# Patient Record
Sex: MALE | Race: WHITE | HISPANIC OR LATINO | URBAN - METROPOLITAN AREA
[De-identification: names, ages, dates, MRNs, and addresses within clinical notes are randomized per-mention and may not be internally consistent; named-entity substitution may affect disease eponyms.]

---

## 2018-09-15 ENCOUNTER — EMERGENCY (EMERGENCY)
Facility: HOSPITAL | Age: 38
LOS: 1 days | Discharge: HOME | End: 2018-09-15
Attending: EMERGENCY MEDICINE
Payer: COMMERCIAL

## 2018-09-15 VITALS
HEART RATE: 75 BPM | WEIGHT: 231.93 LBS | RESPIRATION RATE: 18 BRPM | DIASTOLIC BLOOD PRESSURE: 89 MMHG | TEMPERATURE: 98 F | SYSTOLIC BLOOD PRESSURE: 145 MMHG | OXYGEN SATURATION: 97 % | HEIGHT: 70 IN

## 2018-09-15 PROCEDURE — 99284 EMERGENCY DEPT VISIT MOD MDM: CPT | Mod: 25

## 2018-09-16 VITALS
DIASTOLIC BLOOD PRESSURE: 86 MMHG | OXYGEN SATURATION: 98 % | HEART RATE: 87 BPM | SYSTOLIC BLOOD PRESSURE: 132 MMHG | TEMPERATURE: 99 F | RESPIRATION RATE: 19 BRPM

## 2018-09-16 DIAGNOSIS — Z90.49 ACQUIRED ABSENCE OF OTHER SPECIFIED PARTS OF DIGESTIVE TRACT: Chronic | ICD-10-CM

## 2018-09-16 LAB
ANION GAP SERPL CALC-SCNC: 3 MMOL/L — LOW (ref 5–17)
BASOPHILS # BLD AUTO: 0.1 K/UL — SIGNIFICANT CHANGE UP (ref 0–0.2)
BASOPHILS NFR BLD AUTO: 0.9 % — SIGNIFICANT CHANGE UP (ref 0–2)
BUN SERPL-MCNC: 16 MG/DL — SIGNIFICANT CHANGE UP (ref 7–18)
CALCIUM SERPL-MCNC: 9.3 MG/DL — SIGNIFICANT CHANGE UP (ref 8.4–10.5)
CHLORIDE SERPL-SCNC: 109 MMOL/L — HIGH (ref 96–108)
CO2 SERPL-SCNC: 28 MMOL/L — SIGNIFICANT CHANGE UP (ref 22–31)
CREAT SERPL-MCNC: 0.9 MG/DL — SIGNIFICANT CHANGE UP (ref 0.5–1.3)
EOSINOPHIL # BLD AUTO: 0.2 K/UL — SIGNIFICANT CHANGE UP (ref 0–0.5)
EOSINOPHIL NFR BLD AUTO: 1.3 % — SIGNIFICANT CHANGE UP (ref 0–6)
GLUCOSE SERPL-MCNC: 115 MG/DL — HIGH (ref 70–99)
HCT VFR BLD CALC: 44.5 % — SIGNIFICANT CHANGE UP (ref 39–50)
HGB BLD-MCNC: 14.9 G/DL — SIGNIFICANT CHANGE UP (ref 13–17)
LYMPHOCYTES # BLD AUTO: 23.1 % — SIGNIFICANT CHANGE UP (ref 13–44)
LYMPHOCYTES # BLD AUTO: 3.2 K/UL — SIGNIFICANT CHANGE UP (ref 1–3.3)
MCHC RBC-ENTMCNC: 29.4 PG — SIGNIFICANT CHANGE UP (ref 27–34)
MCHC RBC-ENTMCNC: 33.5 GM/DL — SIGNIFICANT CHANGE UP (ref 32–36)
MCV RBC AUTO: 87.7 FL — SIGNIFICANT CHANGE UP (ref 80–100)
MONOCYTES # BLD AUTO: 0.9 K/UL — SIGNIFICANT CHANGE UP (ref 0–0.9)
MONOCYTES NFR BLD AUTO: 6.1 % — SIGNIFICANT CHANGE UP (ref 2–14)
NEUTROPHILS # BLD AUTO: 9.6 K/UL — HIGH (ref 1.8–7.4)
NEUTROPHILS NFR BLD AUTO: 68.6 % — SIGNIFICANT CHANGE UP (ref 43–77)
PLATELET # BLD AUTO: 255 K/UL — SIGNIFICANT CHANGE UP (ref 150–400)
POTASSIUM SERPL-MCNC: 5.3 MMOL/L — SIGNIFICANT CHANGE UP (ref 3.5–5.3)
POTASSIUM SERPL-SCNC: 5.3 MMOL/L — SIGNIFICANT CHANGE UP (ref 3.5–5.3)
RBC # BLD: 5.08 M/UL — SIGNIFICANT CHANGE UP (ref 4.2–5.8)
RBC # FLD: 12 % — SIGNIFICANT CHANGE UP (ref 10.3–14.5)
SODIUM SERPL-SCNC: 140 MMOL/L — SIGNIFICANT CHANGE UP (ref 135–145)
WBC # BLD: 14 K/UL — HIGH (ref 3.8–10.5)
WBC # FLD AUTO: 14 K/UL — HIGH (ref 3.8–10.5)

## 2018-09-16 PROCEDURE — 51702 INSERT TEMP BLADDER CATH: CPT

## 2018-09-16 PROCEDURE — 99284 EMERGENCY DEPT VISIT MOD MDM: CPT | Mod: 25

## 2018-09-16 PROCEDURE — 85027 COMPLETE CBC AUTOMATED: CPT

## 2018-09-16 PROCEDURE — 96374 THER/PROPH/DIAG INJ IV PUSH: CPT | Mod: XU

## 2018-09-16 PROCEDURE — 81001 URINALYSIS AUTO W/SCOPE: CPT

## 2018-09-16 PROCEDURE — 80048 BASIC METABOLIC PNL TOTAL CA: CPT

## 2018-09-16 PROCEDURE — 87086 URINE CULTURE/COLONY COUNT: CPT

## 2018-09-16 RX ORDER — CEFTRIAXONE 500 MG/1
1 INJECTION, POWDER, FOR SOLUTION INTRAMUSCULAR; INTRAVENOUS ONCE
Qty: 0 | Refills: 0 | Status: COMPLETED | OUTPATIENT
Start: 2018-09-16 | End: 2018-09-16

## 2018-09-16 RX ORDER — AZTREONAM 2 G
1 VIAL (EA) INJECTION
Qty: 14 | Refills: 0 | OUTPATIENT
Start: 2018-09-16 | End: 2018-09-22

## 2018-09-16 RX ADMIN — CEFTRIAXONE 100 GRAM(S): 500 INJECTION, POWDER, FOR SOLUTION INTRAMUSCULAR; INTRAVENOUS at 05:49

## 2018-09-16 NOTE — ED ADULT NURSE NOTE - NSIMPLEMENTINTERV_GEN_ALL_ED
Implemented All Universal Safety Interventions:  [place] to call system. Call bell, personal items and telephone within reach. Instruct patient to call for assistance. Room bathroom lighting operational. Non-slip footwear when patient is off stretcher. Physically safe environment: no spills, clutter or unnecessary equipment. Stretcher in lowest position, wheels locked, appropriate side rails in place.

## 2018-09-16 NOTE — ED PROVIDER NOTE - NS ED ROS FT
Constitutional: - Fever, - Chills, - unexplained weight loss  Eyes: - Discharge, - Irritation, - Redness, - Visual changes, - Light sensitivity  EARS: - Ear Pain, - hearing loss  NOSE: - Congestion, - epistaxis  MOUTH/THROAT: - Vocal Changes, - Drooling, - Sore throat  NECK: - Lumps, - Stiffness, - Pain  CV: - Palpitations, - Chest Pain, - Edema   RESP:  - Cough, - Shortness of Breath, - Dyspnea on Exertion, - Wheezing  GI: - Diarrhea, - Constipation, - Bloody stools, - Nausea, - Vomiting, - Abdominal Pain  : - Dysuria, -Frequency, - Hematuria  MSK: - Myalgias, - focal weakness, - Injuries  SKIN: - Color change, - Rash  NEURO: - Change in behavior, - Dec. Alertness, - Headache, - Change in speech, - Seizure-like activity

## 2018-09-16 NOTE — ED PROVIDER NOTE - MEDICAL DECISION MAKING DETAILS
no risk factors for anticholinergic toxicity, no prostate hx, acute urinary retention with leukocytosis, given leukocytosis with normal prostate exam likely 2/2 UTI, pain relieved by landaverde placement, no BIJAL, will dc with abx for UTI urology and pcp f/u and landaverde to leg bag. pt and wife given extensive return precautions which they demonstrated understanding of. given info for urology f/u.

## 2018-09-16 NOTE — ED PROVIDER NOTE - PHYSICAL EXAMINATION
PE:   GEN: Awake, alert, oriented, interactive, NAD, well appearing.   HEAD: Atraumatic, normocephalic  EYES: Sclera white, conjunctiva pink, PERRLA  CARDIAC: Reg rate and rhythm, S1,S2, no murmur/rub/gallop. Strong central and peripheral pulses, Brisk cap refill, no evident pedal edema.   RESP: Normal respiratory rate and effort, no resp distress, lungs cta b/l without accessory muscle use, wheeze, rhonchi, or rales.   ABD: soft, suprapubic tenderness and distention prior to landaverde, nontender nondistended after landaverde placement  : normal external genitalia, prostate wnl nontender nonboggy  NEURO: AOx3, CN II-XII grossly intact without focal deficits   MSK: Moving all extremities spontaneously, no apparent deformities  SKIN: warm, dry

## 2018-09-16 NOTE — ED PROVIDER NOTE - OBJECTIVE STATEMENT
38M no sig pmh p/w 1d urinary retention. Unable to urinate more than few drops since waking up 9/15. Pt sexually monogamous with wife. Pt denies any prior episodes, dysuria, hematuria, freq, urgency, flank pain, prostate hx, fever, unexplained weight loss, trauma, FB, renal dysfxn, n/v/d/c/melena/brbpr, meds/drugs

## 2018-09-17 LAB
CULTURE RESULTS: NO GROWTH — SIGNIFICANT CHANGE UP
SPECIMEN SOURCE: SIGNIFICANT CHANGE UP

## 2022-11-24 ENCOUNTER — EMERGENCY (EMERGENCY)
Facility: HOSPITAL | Age: 42
LOS: 1 days | Discharge: HOME | End: 2022-11-24
Attending: EMERGENCY MEDICINE
Payer: COMMERCIAL

## 2022-11-24 VITALS
DIASTOLIC BLOOD PRESSURE: 81 MMHG | RESPIRATION RATE: 18 BRPM | SYSTOLIC BLOOD PRESSURE: 130 MMHG | HEART RATE: 85 BPM | OXYGEN SATURATION: 96 % | WEIGHT: 235.01 LBS | HEIGHT: 70 IN | TEMPERATURE: 98 F

## 2022-11-24 DIAGNOSIS — Z90.49 ACQUIRED ABSENCE OF OTHER SPECIFIED PARTS OF DIGESTIVE TRACT: Chronic | ICD-10-CM

## 2022-11-24 LAB
APPEARANCE UR: CLEAR — SIGNIFICANT CHANGE UP
BILIRUB UR-MCNC: NEGATIVE — SIGNIFICANT CHANGE UP
COLOR SPEC: YELLOW — SIGNIFICANT CHANGE UP
DIFF PNL FLD: ABNORMAL
GLUCOSE UR QL: NEGATIVE — SIGNIFICANT CHANGE UP
KETONES UR-MCNC: NEGATIVE — SIGNIFICANT CHANGE UP
LEUKOCYTE ESTERASE UR-ACNC: ABNORMAL
NITRITE UR-MCNC: NEGATIVE — SIGNIFICANT CHANGE UP
PH UR: 5 — SIGNIFICANT CHANGE UP (ref 5–8)
PROT UR-MCNC: 100
SP GR SPEC: 1.02 — SIGNIFICANT CHANGE UP (ref 1.01–1.02)
UROBILINOGEN FLD QL: NEGATIVE — SIGNIFICANT CHANGE UP

## 2022-11-24 PROCEDURE — 74019 RADEX ABDOMEN 2 VIEWS: CPT | Mod: 26

## 2022-11-24 PROCEDURE — 99283 EMERGENCY DEPT VISIT LOW MDM: CPT | Mod: 25

## 2022-11-24 PROCEDURE — 99284 EMERGENCY DEPT VISIT MOD MDM: CPT

## 2022-11-24 PROCEDURE — 87086 URINE CULTURE/COLONY COUNT: CPT

## 2022-11-24 PROCEDURE — 81001 URINALYSIS AUTO W/SCOPE: CPT

## 2022-11-24 PROCEDURE — 74019 RADEX ABDOMEN 2 VIEWS: CPT

## 2022-11-24 NOTE — ED PROVIDER NOTE - OBJECTIVE STATEMENT
42 year old female denies PMH coming in for lower abd pain and blood in his urine. pt states that the pain has completely resolved spontaneously. states he also felt like he was constipated and last Bm was yesterday. denies all other complaints.

## 2022-11-24 NOTE — ED PROVIDER NOTE - PATIENT PORTAL LINK FT
You can access the FollowMyHealth Patient Portal offered by [facility] by registering at the following website: [email or web address]. By joining [employer]’s FollowMyHealth portal, you will also be able to view your health information using other applications (apps) compatible with our system.

## 2022-11-24 NOTE — ED PROVIDER NOTE - CLINICAL SUMMARY MEDICAL DECISION MAKING FREE TEXT BOX
Received request via: Pharmacy    Was the patient seen in the last year in this department? Yes    Does the patient have an active prescription (recently filled or refills available) for medication(s) requested? No    
42 year old male with hematuria/difficulty urinating, constipation. PE as above.  UA, bladder scan, xray abdomen, reassess

## 2022-11-24 NOTE — ED PROVIDER NOTE - NSFOLLOWUPINSTRUCTIONS_ED_ALL_ED_FT
[facility]                                                                                                                    Kidney Stones       Kidney stones are solid, rock-like deposits that form inside of the kidneys. The kidneys are a pair of organs that make urine. A kidney stone may form in a kidney and move into other parts of the urinary tract, including the tubes that connect the kidneys to the bladder (ureters), the bladder, and the tube that carries urine out of the body (urethra). As the stone moves through these areas, it can cause intense pain and block the flow of urine.    Kidney stones are created when high levels of certain minerals are found in the urine. The stones are usually passed out of the body through urination, but in some cases, medical treatment may be needed to remove them.      What are the causes?    Kidney stones may be caused by:  •A condition in which certain glands produce too much parathyroid hormone (primary hyperparathyroidism), which causes too much calcium buildup in the blood.      •A buildup of uric acid crystals in the bladder (hyperuricosuria). Uric acid is a chemical that the body produces when you eat certain foods. It usually exits the body in the urine.      •Narrowing (stricture) of one or both of the ureters.      •A kidney blockage that is present at birth (congenital obstruction).      •Past surgery on the kidney or the ureters, such as gastric bypass surgery.        What increases the risk?    The following factors may make you more likely to develop this condition:  •Having had a kidney stone in the past.      •Having a family history of kidney stones.      •Not drinking enough water.      •Eating a diet that is high in protein, salt (sodium), or sugar.      •Being overweight or obese.        What are the signs or symptoms?    Symptoms of a kidney stone may include:  •Pain in the side of the abdomen, right below the ribs (flank pain). Pain usually spreads (radiates) to the groin.      •Needing to urinate frequently or urgently.      •Painful urination.      •Blood in the urine (hematuria).      •Nausea.      •Vomiting.      •Fever and chills.        How is this diagnosed?    This condition may be diagnosed based on:  •Your symptoms and medical history.      •A physical exam.      •Blood tests.      •Urine tests. These may be done before and after the stone passes out of your body through urination.      •Imaging tests, such as a CT scan, abdominal X-ray, or ultrasound.      •A procedure to examine the inside of the bladder (cystoscopy).        How is this treated?    Treatment for kidney stones depends on the size, location, and makeup of the stones. Kidney stones will often pass out of the body through urination. You may need to:  •Increase your fluid intake to help pass the stone. In some cases, you may be given fluids through an IV and may need to be monitored at the hospital.      •Take medicine for pain.      •Make changes in your diet to help prevent kidney stones from coming back.      Sometimes, medical procedures are needed to remove a kidney stone. This may involve:•A procedure to break up kidney stones using:  •A focused beam of light (laser therapy).      •Shock waves (extracorporeal shock wave lithotripsy).        •Surgery to remove kidney stones. This may be needed if you have severe pain or have stones that block your urinary tract.        Follow these instructions at home:    Medicines     •Take over-the-counter and prescription medicines only as told by your health care provider.      •Ask your health care provider if the medicine prescribed to you requires you to avoid driving or using heavy machinery.      Eating and drinking     •Drink enough fluid to keep your urine pale yellow. You may be instructed to drink at least 8–10 glasses of water each day. This will help you pass the kidney stone.    •If directed, change your diet. This may include:  •Limiting how much sodium you eat.      •Eating more fruits and vegetables.      •Limiting how much animal protein—such as red meat, poultry, fish, and eggs—you eat.        •Follow instructions from your health care provider about eating or drinking restrictions.      General instructions   •Collect urine samples as told by your health care provider. You may need to collect a urine sample:  •24 hours after you pass the stone.      •8–12 weeks after passing the kidney stone, and every 6–12 months after that.        •Strain your urine every time you urinate, for as long as directed. Use the strainer that your health care provider recommends.      • Do not throw out the kidney stone after passing it. Keep the stone so it can be tested by your health care provider. Testing the makeup of your kidney stone may help prevent you from getting kidney stones in the future.      •Keep all follow-up visits as told by your health care provider. This is important. You may need follow-up X-rays or ultrasounds to make sure that your stone has passed.        How is this prevented?     To prevent another kidney stone:  •Drink enough fluid to keep your urine pale yellow. This is the best way to prevent kidney stones.      •Eat a healthy diet and follow recommendations from your health care provider about foods to avoid. You may be instructed to eat a low-protein diet. Recommendations vary depending on the type of kidney stone that you have.      •Maintain a healthy weight.        Where to find more information    •National Kidney Foundation (NKF): www.kidney.org      •[employer] (UCF): www.urologyhealth.org        Contact a health care provider if:    •You have pain that gets worse or does not get better with medicine.        Get help right away if:    •You have a fever or chills.      •You develop severe pain.      •You develop new abdominal pain.      •You faint.      •You are unable to urinate.        Summary    •Kidney stones are solid, rock-like deposits that form inside of the kidneys.      •Kidney stones can cause nausea, vomiting, blood in the urine, abdominal pain, and the urge to urinate frequently.      •Treatment for kidney stones depends on the size, location, and makeup of the stones. Kidney stones will often pass out of the body through urination.      •Kidney stones can be prevented by drinking enough fluids, eating a healthy diet, and maintaining a healthy weight.      This information is not intended to replace advice given to you by your health care provider. Make sure you discuss any questions you have with your health care provider.      Document Revised: 05/01/2020 Document Reviewed: 05/05/2020    [employer] Patient Education © 2022 [employer]

## 2022-11-25 LAB
CULTURE RESULTS: SIGNIFICANT CHANGE UP
SPECIMEN SOURCE: SIGNIFICANT CHANGE UP

## 2024-02-26 NOTE — ED ADULT NURSE NOTE - PAIN: PRESENCE, MLM
Dr. Paez's office will contact patient for a 2 week follow up appointment.    complains of pain/discomfort

## 2024-10-10 NOTE — ED ADULT TRIAGE NOTE - HISTORY OF COVID-19 VACCINATION
I agree with the Care Coordinator's Plan of Care   I attempted to call patient but no answer - wanted to discuss if need antibiotic at this time.  Consider Augmentin 875mg BID x 7 days.   Yes

## 2024-11-08 NOTE — ED PROVIDER NOTE - PROGRESS NOTE DETAILS
No
UA with+blood and calcium oxalate crystals. no uti. xray abdomen unremarkable. ?passed renal stone given calcium oxalate crystals. will dc. f/u with urology. return precautions discussed.

## 2024-11-20 VITALS
DIASTOLIC BLOOD PRESSURE: 91 MMHG | TEMPERATURE: 97 F | HEART RATE: 64 BPM | WEIGHT: 254.63 LBS | HEIGHT: 70 IN | RESPIRATION RATE: 16 BRPM | SYSTOLIC BLOOD PRESSURE: 142 MMHG | OXYGEN SATURATION: 96 %

## 2024-11-20 NOTE — PATIENT PROFILE ADULT - FALL HARM RISK - HARM RISK INTERVENTIONS

## 2024-11-21 ENCOUNTER — OUTPATIENT (OUTPATIENT)
Dept: INPATIENT UNIT | Facility: HOSPITAL | Age: 44
LOS: 1 days | Discharge: HOME | End: 2024-11-21
Payer: COMMERCIAL

## 2024-11-21 DIAGNOSIS — Z98.890 OTHER SPECIFIED POSTPROCEDURAL STATES: Chronic | ICD-10-CM

## 2024-11-21 DIAGNOSIS — Z90.49 ACQUIRED ABSENCE OF OTHER SPECIFIED PARTS OF DIGESTIVE TRACT: Chronic | ICD-10-CM

## 2024-11-21 PROCEDURE — 88305 TISSUE EXAM BY PATHOLOGIST: CPT | Mod: 26

## 2024-11-21 DEVICE — MESH HERNIA INGUINAL PARIETEX PROGRIP RECTANGLE 15 X 15CM: Type: IMPLANTABLE DEVICE | Site: ABDOMEN | Status: FUNCTIONAL

## 2024-11-21 RX ORDER — INFLUENZA VIRUS VACCINE 15; 15; 15; 15 UG/.5ML; UG/.5ML; UG/.5ML; UG/.5ML
0.5 SUSPENSION INTRAMUSCULAR ONCE
Refills: 0 | Status: ACTIVE | OUTPATIENT
Start: 2024-11-21 | End: 2025-10-20

## 2024-11-21 RX ORDER — FAMOTIDINE 20 MG/1
20 TABLET, FILM COATED ORAL ONCE
Refills: 0 | Status: COMPLETED | OUTPATIENT
Start: 2024-11-21 | End: 2024-11-21

## 2024-11-21 RX ORDER — SITAGLIPTIN 50 MG/1
1 TABLET ORAL
Refills: 0 | DISCHARGE

## 2024-11-21 RX ORDER — DIPHENHYDRAMINE HCL 25 MG
25 CAPSULE ORAL ONCE
Refills: 0 | Status: DISCONTINUED | OUTPATIENT
Start: 2024-11-21 | End: 2024-11-21

## 2024-11-21 RX ORDER — OXYCODONE HYDROCHLORIDE 30 MG/1
1 TABLET ORAL
Qty: 10 | Refills: 0
Start: 2024-11-21

## 2024-11-21 RX ORDER — DIPHENHYDRAMINE HCL 25 MG
25 CAPSULE ORAL ONCE
Refills: 0 | Status: COMPLETED | OUTPATIENT
Start: 2024-11-21 | End: 2024-11-21

## 2024-11-21 RX ORDER — ONDANSETRON HYDROCHLORIDE 4 MG/1
4 TABLET, FILM COATED ORAL EVERY 6 HOURS
Refills: 0 | Status: ACTIVE | OUTPATIENT
Start: 2024-11-21 | End: 2025-10-20

## 2024-11-21 RX ORDER — DEXAMETHASONE 1.5 MG/1
4 TABLET ORAL ONCE
Refills: 0 | Status: COMPLETED | OUTPATIENT
Start: 2024-11-21 | End: 2024-11-21

## 2024-11-21 RX ORDER — DOCUSATE SODIUM 100 MG
1 CAPSULE ORAL
Qty: 10 | Refills: 0
Start: 2024-11-21 | End: 2024-11-25

## 2024-11-21 RX ORDER — OXYCODONE HYDROCHLORIDE 30 MG/1
2.5 TABLET ORAL EVERY 6 HOURS
Refills: 0 | Status: DISCONTINUED | OUTPATIENT
Start: 2024-11-21 | End: 2024-11-21

## 2024-11-21 RX ORDER — 0.9 % SODIUM CHLORIDE 0.9 %
1000 INTRAVENOUS SOLUTION INTRAVENOUS
Refills: 0 | Status: DISCONTINUED | OUTPATIENT
Start: 2024-11-21 | End: 2024-11-22

## 2024-11-21 RX ORDER — 0.9 % SODIUM CHLORIDE 0.9 %
1000 INTRAVENOUS SOLUTION INTRAVENOUS
Refills: 0 | Status: ACTIVE | OUTPATIENT
Start: 2024-11-21 | End: 2025-10-20

## 2024-11-21 RX ORDER — HEPARIN SODIUM,PORCINE 1000/ML
7500 VIAL (ML) INJECTION EVERY 8 HOURS
Refills: 0 | Status: ACTIVE | OUTPATIENT
Start: 2024-11-21 | End: 2025-10-20

## 2024-11-21 RX ORDER — GLUCAGON INJECTION, SOLUTION 0.5 MG/.1ML
1 INJECTION, SOLUTION SUBCUTANEOUS ONCE
Refills: 0 | Status: ACTIVE | OUTPATIENT
Start: 2024-11-21 | End: 2025-10-20

## 2024-11-21 RX ORDER — HYDROMORPHONE HYDROCHLORIDE 2 MG/1
0.5 TABLET ORAL
Refills: 0 | Status: DISCONTINUED | OUTPATIENT
Start: 2024-11-21 | End: 2024-11-21

## 2024-11-21 RX ORDER — ACETAMINOPHEN 500MG 500 MG/1
650 TABLET, COATED ORAL EVERY 6 HOURS
Refills: 0 | Status: ACTIVE | OUTPATIENT
Start: 2024-11-21 | End: 2025-10-20

## 2024-11-21 RX ORDER — OXYCODONE HYDROCHLORIDE 30 MG/1
5 TABLET ORAL EVERY 6 HOURS
Refills: 0 | Status: DISCONTINUED | OUTPATIENT
Start: 2024-11-21 | End: 2024-11-22

## 2024-11-21 RX ADMIN — DEXAMETHASONE 4 MILLIGRAM(S): 1.5 TABLET ORAL at 13:08

## 2024-11-21 RX ADMIN — Medication 75 MILLILITER(S): at 17:16

## 2024-11-21 RX ADMIN — Medication 25 MILLIGRAM(S): at 12:55

## 2024-11-21 RX ADMIN — Medication 4: at 22:55

## 2024-11-21 RX ADMIN — Medication 7500 UNIT(S): at 19:08

## 2024-11-21 RX ADMIN — FAMOTIDINE 20 MILLIGRAM(S): 20 TABLET, FILM COATED ORAL at 13:04

## 2024-11-21 RX ADMIN — Medication 4: at 17:30

## 2024-11-21 NOTE — BRIEF OPERATIVE NOTE - OPERATION/FINDINGS
Lower midline incision through previous scar. Subcutaneous tissue dissected down to fascia and lipocutaneous flaps raised circumferentially around ventral hernia. Adhesions dissected and sac ligated with 2.0 vicryl. Omental contents reduced back into abdomen. Posterior component separation completed and posterior rectus sheath closed with running 2.0 pds. Retrorectus space measured [id number]. Progrip mesh cut to size and placed into retrorectus space. Wound copiously irrigated with irricept. Anterior fascia closed with running 0 PDS suture. 19Fr kadi drain placed in subcutaneous space. Tissue closed in layers.

## 2024-11-21 NOTE — BRIEF OPERATIVE NOTE - NSICDXBRIEFPROCEDURE_GEN_ALL_CORE_FT
PROCEDURES:  Open repair of ventral hernia using component separation technique 21-Nov-2024 11:09:35 with retrorectus mesh Carlos Means

## 2024-11-21 NOTE — PRE-ANESTHESIA EVALUATION ADULT - NSDENTALSD_ENT_ALL_CORE
Missing rear molars, intermittent premolars. Poor dentition with heavily chipped teeth, ground/worn teeth, cavities./missing teeth

## 2024-11-21 NOTE — PRE-ANESTHESIA EVALUATION ADULT - NSANTHPMHFT_GEN_ALL_CORE
Cardiac: Positive for HLD. Denies HTN, MI/Angina/Heart Failure, Arrhythmia, Murmur/Valvular Disorder. >4 METS  Pulmonary: Denies Asthma, COPD, NOAH  Renal: Denies kidney dysfunction  Hepatic: Denies liver dysfunction, mild ALT elevation 47 noted on preoperative laboratories.   Gastrointestinal: Denies GERD/IBS  Endocrine: Positive for DM type II. Denies thyroid dysfunction.  Neurologic: Positive for left sided hearing loss. Denies stroke/seizure disorder  Hematologic: Denies anemia, blood clotting disorder, blood thinning medication.     PSH: Right arm surgery, intestinal surgery, cholecystectomy. Cardiac: Positive for HLD. Denies HTN, MI/Angina/Heart Failure, Arrhythmia, Murmur/Valvular Disorder. >4 METS  Pulmonary: Positive for NOAH non-compliant with CPAP. Denies Asthma, COPD.  Renal: Denies kidney dysfunction  Hepatic: Denies liver dysfunction, mild ALT elevation 47 noted on preoperative laboratories.   Gastrointestinal: Denies GERD/IBS  Endocrine: Positive for DM type II. Denies thyroid dysfunction.  Neurologic: Positive for left sided hearing loss. Denies stroke/seizure disorder  Hematologic: Denies anemia, blood clotting disorder, blood thinning medication.     PSH: Right arm surgery, intestinal surgery, cholecystectomy.

## 2024-11-21 NOTE — PRE-ANESTHESIA EVALUATION ADULT - NSANTHOSAYNRD_GEN_A_CORE
No. NOAH screening performed.  STOP BANG Legend: 0-2 = LOW Risk; 3-4 = INTERMEDIATE Risk; 5-8 = HIGH Risk Yes

## 2024-11-21 NOTE — ASU DISCHARGE PLAN (ADULT/PEDIATRIC) - FINANCIAL ASSISTANCE
[facility] provides services at a reduced cost to those who are determined to be eligible through [facility]’s financial assistance program. Information regarding [facility]’s financial assistance program can be found by going to [email or web address] or by calling [phone].
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
## 2024-11-21 NOTE — ASU DISCHARGE PLAN (ADULT/PEDIATRIC) - ASU DC SPECIAL INSTRUCTIONSFT
Follow up with Dr. Perla in 1-2 weeks. Call the office at the number below to schedule your appointment. You may shower; soap and water over incision sites. Do not scrub. Pat dry when done. No tub bathing or swimming until cleared. Keep incision sites out of the sun as scars will darken. Ambulate as tolerated, but no heavy lifting (>10lbs) or strenuous exercise. You may resume regular diet. You should be urinating at least 3-4x per day. Call the office if you experience increasing abdominal pain, nausea, vomiting, or temperature >101 F.    Please take Tylenol 650mg every six hours as needed for pain. For pain not controlled with Tylenol, please take Oxycodone 5mg every six hours, as needed. On days which you take Oxycodone, please also take Colace 100mg capsule twice daily to prevent constipation.

## 2024-11-21 NOTE — PROGRESS NOTE ADULT - SUBJECTIVE AND OBJECTIVE BOX
POST-OPERATIVE NOTE    Procedure: ventral hernia repair with posterior component separation and retrorectus mesh     Diagnosis/Indication: incisional hernia     Surgeon: Dr. Perla     S: Patient was seen and examined postoperatively. Patient has no complaints. Tolerating regular diet. Denies CP, SOB, N/V. Pain controlled with medication.    O:  T(C): 36.6 (11-21-24 @ 10:45), Max: 36.6 (11-21-24 @ 10:45)  T(F): 97.8 (11-21-24 @ 10:45), Max: 97.8 (11-21-24 @ 10:45)  HR: 80 (11-21-24 @ 13:00) (72 - 90)  BP: 111/65 (11-21-24 @ 13:00) (111/65 - 142/77)  RR: 16 (11-21-24 @ 13:00) (16 - 16)  SpO2: 92% (11-21-24 @ 13:00) (91% - 97%)  Wt(kg): --            Gen: NAD, resting comfortably in bed  Pulm: Nonlabored breathing, no respiratory distress  Abd: soft, NT/ND. vertical umbilical incision c/d/i. JPx 1 serosanguinous   Extrem: WWP, (-) edema

## 2024-11-21 NOTE — PRE-ANESTHESIA EVALUATION ADULT - NSANTHADDINFOFT_GEN_ALL_CORE
Blood glucose elevation 203 mg/dL noted preoperatively. After discussion with attending surgeon, Dr. Malachi Perla MD, plan will be to admit patient for overnight observation. Dr. Law Arredondo MD,  of Department of Anesthesia, [facility], in agreement with plan.
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
## 2024-11-21 NOTE — ASU DISCHARGE PLAN (ADULT/PEDIATRIC) - CARE PROVIDER_API CALL
Malachi Perla [facility]  Surgery  [address] [place] [id number]  Phone: [phone]  Fax: [phone]  Follow Up Time:

## 2024-11-22 ENCOUNTER — TRANSCRIPTION ENCOUNTER (OUTPATIENT)
Age: 44
End: 2024-11-22

## 2024-11-22 VITALS — TEMPERATURE: 97 F

## 2024-11-22 LAB
ANION GAP SERPL CALC-SCNC: 10 MMOL/L — SIGNIFICANT CHANGE UP (ref 5–17)
BUN SERPL-MCNC: 14 MG/DL — SIGNIFICANT CHANGE UP (ref 7–23)
CALCIUM SERPL-MCNC: 9.2 MG/DL — SIGNIFICANT CHANGE UP (ref 8.4–10.5)
CHLORIDE SERPL-SCNC: 104 MMOL/L — SIGNIFICANT CHANGE UP (ref 96–108)
CO2 SERPL-SCNC: 22 MMOL/L — SIGNIFICANT CHANGE UP (ref 22–31)
CREAT SERPL-MCNC: 0.91 MG/DL — SIGNIFICANT CHANGE UP (ref 0.5–1.3)
EGFR: 108 ML/MIN/1.73M2 — SIGNIFICANT CHANGE UP
GLUCOSE SERPL-MCNC: 178 MG/DL — HIGH (ref 70–99)
HCT VFR BLD CALC: 38.2 % — LOW (ref 39–50)
HGB BLD-MCNC: 12.4 G/DL — LOW (ref 13–17)
MAGNESIUM SERPL-MCNC: 1.7 MG/DL — SIGNIFICANT CHANGE UP (ref 1.6–2.6)
MCHC RBC-ENTMCNC: 28.5 PG — SIGNIFICANT CHANGE UP (ref 27–34)
MCHC RBC-ENTMCNC: 32.5 G/DL — SIGNIFICANT CHANGE UP (ref 32–36)
MCV RBC AUTO: 87.8 FL — SIGNIFICANT CHANGE UP (ref 80–100)
NRBC # BLD: 0 /100 WBCS — SIGNIFICANT CHANGE UP (ref 0–0)
PHOSPHATE SERPL-MCNC: 3.6 MG/DL — SIGNIFICANT CHANGE UP (ref 2.5–4.5)
PLATELET # BLD AUTO: 247 K/UL — SIGNIFICANT CHANGE UP (ref 150–400)
POTASSIUM SERPL-MCNC: 4.3 MMOL/L — SIGNIFICANT CHANGE UP (ref 3.5–5.3)
POTASSIUM SERPL-SCNC: 4.3 MMOL/L — SIGNIFICANT CHANGE UP (ref 3.5–5.3)
RBC # BLD: 4.35 M/UL — SIGNIFICANT CHANGE UP (ref 4.2–5.8)
RBC # FLD: 12.4 % — SIGNIFICANT CHANGE UP (ref 10.3–14.5)
SODIUM SERPL-SCNC: 136 MMOL/L — SIGNIFICANT CHANGE UP (ref 135–145)
WBC # BLD: 16.42 K/UL — HIGH (ref 3.8–10.5)
WBC # FLD AUTO: 16.42 K/UL — HIGH (ref 3.8–10.5)

## 2024-11-22 PROCEDURE — 86850 RBC ANTIBODY SCREEN: CPT

## 2024-11-22 PROCEDURE — 49593 RPR AA HRN 1ST 3-10 RDC: CPT

## 2024-11-22 PROCEDURE — 88305 TISSUE EXAM BY PATHOLOGIST: CPT

## 2024-11-22 PROCEDURE — 15734 MUSCLE-SKIN GRAFT TRUNK: CPT | Mod: XU

## 2024-11-22 PROCEDURE — 86900 BLOOD TYPING SEROLOGIC ABO: CPT

## 2024-11-22 PROCEDURE — 85027 COMPLETE CBC AUTOMATED: CPT

## 2024-11-22 PROCEDURE — C1781: CPT

## 2024-11-22 PROCEDURE — 49596 RPR AA HRN 1ST > 10 NCR/STRN: CPT

## 2024-11-22 PROCEDURE — 80048 BASIC METABOLIC PNL TOTAL CA: CPT

## 2024-11-22 PROCEDURE — 64646 CHEMODENERV TRUNK MUSC 1-5: CPT

## 2024-11-22 PROCEDURE — 83735 ASSAY OF MAGNESIUM: CPT

## 2024-11-22 PROCEDURE — 86901 BLOOD TYPING SEROLOGIC RH(D): CPT

## 2024-11-22 PROCEDURE — 36415 COLL VENOUS BLD VENIPUNCTURE: CPT

## 2024-11-22 PROCEDURE — 84100 ASSAY OF PHOSPHORUS: CPT

## 2024-11-22 PROCEDURE — 82962 GLUCOSE BLOOD TEST: CPT

## 2024-11-22 RX ADMIN — Medication 7500 UNIT(S): at 03:38

## 2024-11-22 RX ADMIN — Medication 2: at 06:37

## 2024-11-22 RX ADMIN — OXYCODONE HYDROCHLORIDE 5 MILLIGRAM(S): 30 TABLET ORAL at 08:52

## 2024-11-22 NOTE — DISCHARGE NOTE NURSING/CASE MANAGEMENT/SOCIAL WORK - FINANCIAL ASSISTANCE
[facility] provides services at a reduced cost to those who are determined to be eligible through [facility]’s financial assistance program. Information regarding [facility]’s financial assistance program can be found by going to [email or web address] or by calling [phone].

## 2024-11-22 NOTE — DISCHARGE NOTE NURSING/CASE MANAGEMENT/SOCIAL WORK - NSDCVIVACCINE_GEN_ALL_CORE_FT
Tdap; 04-Oct-2014 02:12; Kenya Morales); [employer]; [id number]; IntraMuscular; Deltoid Left.; 0.5 milliLiter(s);

## 2024-11-22 NOTE — DISCHARGE NOTE NURSING/CASE MANAGEMENT/SOCIAL WORK - PATIENT PORTAL LINK FT
You can access the FollowMyHealth Patient Portal offered by [facility] by registering at the following website: [email or web address]. By joining [employer]’s FollowMyHealth portal, you will also be able to view your health information using other applications (apps) compatible with our system.
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
## 2024-11-22 NOTE — PROGRESS NOTE ADULT - SUBJECTIVE AND OBJECTIVE BOX
POST-OP DAY: #1 s/p ventral hernia repair with posterior component separation and retrorectus mesh     SUBJECTIVE: Patient seen and examined bedside by chief resident on AM rounds. Patient states he feels well and has no acute complaints. Pain remains controlled. Patient tolerated a regular diet without any nausea/vomiting.     heparin   Injectable 7500 Unit(s) SubCutaneous every 8 hours    MEDICATIONS  (PRN):  acetaminophen     Tablet .. 650 milliGRAM(s) Oral every 6 hours PRN Mild Pain (1 - 3)  dextrose Oral Gel 15 Gram(s) Oral once PRN Blood Glucose LESS THAN 70 milliGRAM(s)/deciliter  HYDROmorphone  Injectable 0.5 milliGRAM(s) IV Push every 15 minutes PRN Severe Pain (7 - 10)  ondansetron Injectable 4 milliGRAM(s) IV Push every 6 hours PRN Nausea  oxyCODONE    IR 2.5 milliGRAM(s) Oral every 6 hours PRN Moderate Pain (4 - 6)  oxyCODONE    IR 5 milliGRAM(s) Oral every 6 hours PRN Severe Pain (7 - 10)      I&O's Detail    21 Nov 2024 07:01  -  22 Nov 2024 07:00  --------------------------------------------------------  IN:    Lactated Ringers: 225 mL  Total IN: 225 mL    OUT:    Bulb (mL): 100 mL    Voided (mL): 1000 mL  Total OUT: 1100 mL    Total NET: -875 mL          Vital Signs Last 24 Hrs  T(C): 36.3 (22 Nov 2024 04:58), Max: 36.7 (21 Nov 2024 22:11)  T(F): 97.3 (22 Nov 2024 04:58), Max: 98.1 (21 Nov 2024 22:11)  HR: 75 (22 Nov 2024 04:22) (72 - 90)  BP: 136/72 (22 Nov 2024 04:22) (111/63 - 142/77)  BP(mean): 95 (22 Nov 2024 04:22) (80 - 103)  RR: 18 (22 Nov 2024 04:22) (16 - 18)  SpO2: 94% (22 Nov 2024 04:22) (91% - 97%)    Parameters below as of 22 Nov 2024 04:22  Patient On (Oxygen Delivery Method): room air        General: NAD, resting comfortably in bed  Pulm: Nonlabored breathing, no respiratory distress  Abd: soft, mild distension, non TTP. abdominal binder in place. JPx1 with minimal serosanguinous output.   Extrem: WWP, no edema, SCDs in place    LABS:                        12.4   16.42 )-----------( 247      ( 22 Nov 2024 05:30 )             38.2     11-22    136  |  104  |  14  ----------------------------<  178[H]  4.3   |  22  |  0.91    Ca    9.2      22 Nov 2024 05:30  Phos  3.6     11-22  Mg     1.7     11-22        Urinalysis Basic - ( 22 Nov 2024 05:30 )    Color: x / Appearance: x / SG: x / pH: x  Gluc: 178 mg/dL / Ketone: x  / Bili: x / Urobili: x   Blood: x / Protein: x / Nitrite: x   Leuk Esterase: x / RBC: x / WBC x   Sq Epi: x / Non Sq Epi: x / Bacteria: x        RADIOLOGY & ADDITIONAL STUDIES:

## 2024-12-02 DIAGNOSIS — K43.0 INCISIONAL HERNIA WITH OBSTRUCTION, WITHOUT GANGRENE: ICD-10-CM

## 2024-12-02 DIAGNOSIS — K42.9 UMBILICAL HERNIA WITHOUT OBSTRUCTION OR GANGRENE: ICD-10-CM

## 2024-12-02 DIAGNOSIS — M62.08 SEPARATION OF MUSCLE (NONTRAUMATIC), OTHER SITE: ICD-10-CM

## 2025-09-08 ENCOUNTER — NON-APPOINTMENT (OUTPATIENT)
Age: 45
End: 2025-09-08

## 2025-09-10 ENCOUNTER — APPOINTMENT (OUTPATIENT)
Dept: PULMONOLOGY | Facility: CLINIC | Age: 45
End: 2025-09-10
Payer: COMMERCIAL

## 2025-09-10 VITALS
HEART RATE: 59 BPM | WEIGHT: 200 LBS | DIASTOLIC BLOOD PRESSURE: 80 MMHG | OXYGEN SATURATION: 99 % | TEMPERATURE: 98.3 F | SYSTOLIC BLOOD PRESSURE: 117 MMHG | BODY MASS INDEX: 28.63 KG/M2 | HEIGHT: 70 IN

## 2025-09-10 DIAGNOSIS — Z87.891 PERSONAL HISTORY OF NICOTINE DEPENDENCE: ICD-10-CM

## 2025-09-10 DIAGNOSIS — R91.8 OTHER NONSPECIFIC ABNORMAL FINDING OF LUNG FIELD: ICD-10-CM

## 2025-09-10 LAB — HEMOGLOBIN: NORMAL

## 2025-09-10 PROCEDURE — 99204 OFFICE O/P NEW MOD 45 MIN: CPT | Mod: 25

## 2025-09-10 PROCEDURE — 94729 DIFFUSING CAPACITY: CPT

## 2025-09-10 PROCEDURE — ZZZZZ: CPT

## 2025-09-10 PROCEDURE — 85018 HEMOGLOBIN: CPT | Mod: QW

## 2025-09-10 PROCEDURE — 94010 BREATHING CAPACITY TEST: CPT

## 2025-09-10 PROCEDURE — 94727 GAS DIL/WSHOT DETER LNG VOL: CPT

## 2025-09-11 PROBLEM — Z87.891 FORMER SMOKER: Status: ACTIVE | Noted: 2025-09-11

## (undated) DEVICE — SUT NOVAFIL 2-0 30" T-19

## (undated) DEVICE — WOUND IRR IRRISEPT W 0.5 CHG

## (undated) DEVICE — SUT MONOCRYL 4-0 18" PS-2

## (undated) DEVICE — PACK GENERAL MINOR

## (undated) DEVICE — WARMING BLANKET UPPER ADULT

## (undated) DEVICE — Device

## (undated) DEVICE — POSITIONER FOAM EGG CRATE ULNAR 2PCS (PINK)

## (undated) DEVICE — SUT VICRYL 3-0 27" SH

## (undated) DEVICE — VENODYNE/SCD SLEEVE CALF MEDIUM

## (undated) DEVICE — SUT VICRYL PLUS 2-0 18" TIES UNDYED

## (undated) DEVICE — DRAPE IOBAN 23" X 23"

## (undated) DEVICE — ELCTR BOVIE PENCIL SMOKE EVACUATION

## (undated) DEVICE — DRAPE TOWEL BLUE 17" X 24"

## (undated) DEVICE — SUT NOVAFIL 0 30" T-19

## (undated) DEVICE — GLV 8 PROTEXIS (WHITE)

## (undated) DEVICE — SUCTION YANKAUER BULBOUS TIP W VENT

## (undated) DEVICE — GOWN XL

## (undated) DEVICE — DRAIN RESERVOIR FOR JACKSON PRATT 100CC CARDINAL

## (undated) DEVICE — SUT VICRYL 2-0 27" SH

## (undated) DEVICE — ELCTR BOVIE TIP BLADE INSULATED 2.75" EDGE